# Patient Record
Sex: FEMALE | URBAN - METROPOLITAN AREA
[De-identification: names, ages, dates, MRNs, and addresses within clinical notes are randomized per-mention and may not be internally consistent; named-entity substitution may affect disease eponyms.]

---

## 2022-05-27 ENCOUNTER — OFFICE VISIT (OUTPATIENT)
Dept: OBGYN CLINIC | Facility: CLINIC | Age: 22
End: 2022-05-27
Payer: COMMERCIAL

## 2022-05-27 VITALS
HEIGHT: 65 IN | SYSTOLIC BLOOD PRESSURE: 110 MMHG | DIASTOLIC BLOOD PRESSURE: 74 MMHG | BODY MASS INDEX: 23.57 KG/M2 | HEART RATE: 81 BPM | WEIGHT: 141.5 LBS

## 2022-05-27 DIAGNOSIS — N89.8 VAGINAL DISCHARGE: ICD-10-CM

## 2022-05-27 DIAGNOSIS — Z11.3 ROUTINE SCREENING FOR STI (SEXUALLY TRANSMITTED INFECTION): ICD-10-CM

## 2022-05-27 DIAGNOSIS — Z01.419 ENCOUNTER FOR WELL WOMAN EXAM WITH ROUTINE GYNECOLOGICAL EXAM: Primary | ICD-10-CM

## 2022-05-27 RX ORDER — IBUPROFEN 600 MG/1
TABLET ORAL
COMMUNITY

## 2022-05-27 RX ORDER — BUSPIRONE HYDROCHLORIDE 5 MG/1
TABLET ORAL
COMMUNITY
Start: 2022-05-23 | End: 2022-05-27

## 2022-05-27 RX ORDER — NORTRIPTYLINE HYDROCHLORIDE 10 MG/1
10 CAPSULE ORAL NIGHTLY
COMMUNITY
Start: 2022-05-02 | End: 2023-05-02

## 2022-05-27 RX ORDER — RIZATRIPTAN BENZOATE 10 MG/1
10 TABLET ORAL
COMMUNITY
Start: 2022-05-02

## 2022-05-27 RX ORDER — BUSPIRONE HYDROCHLORIDE 5 MG/1
1 TABLET ORAL 3 TIMES DAILY
COMMUNITY
Start: 2022-05-23 | End: 2022-05-27

## 2022-05-31 DIAGNOSIS — B96.89 BV (BACTERIAL VAGINOSIS): Primary | ICD-10-CM

## 2022-05-31 DIAGNOSIS — N76.0 BV (BACTERIAL VAGINOSIS): Primary | ICD-10-CM

## 2022-05-31 LAB
C TRACH DNA SPEC QL NAA+PROBE: NEGATIVE
N GONORRHOEA DNA SPEC QL NAA+PROBE: NEGATIVE

## 2022-05-31 RX ORDER — METRONIDAZOLE 7.5 MG/G
1 GEL VAGINAL NIGHTLY
Qty: 70 G | Refills: 0 | Status: SHIPPED | OUTPATIENT
Start: 2022-05-31 | End: 2022-06-05

## 2025-03-10 ENCOUNTER — OFFICE VISIT (OUTPATIENT)
Dept: FAMILY MEDICINE CLINIC | Facility: CLINIC | Age: 25
End: 2025-03-10
Payer: COMMERCIAL

## 2025-03-10 ENCOUNTER — PATIENT MESSAGE (OUTPATIENT)
Dept: FAMILY MEDICINE CLINIC | Facility: CLINIC | Age: 25
End: 2025-03-10

## 2025-03-10 VITALS
OXYGEN SATURATION: 99 % | HEIGHT: 65 IN | WEIGHT: 199 LBS | HEART RATE: 78 BPM | BODY MASS INDEX: 33.15 KG/M2 | DIASTOLIC BLOOD PRESSURE: 70 MMHG | SYSTOLIC BLOOD PRESSURE: 118 MMHG

## 2025-03-10 DIAGNOSIS — Z23 NEED FOR DIPHTHERIA-TETANUS-PERTUSSIS (TDAP) VACCINE: ICD-10-CM

## 2025-03-10 DIAGNOSIS — F90.0 ATTENTION DEFICIT HYPERACTIVITY DISORDER (ADHD), PREDOMINANTLY INATTENTIVE TYPE: ICD-10-CM

## 2025-03-10 DIAGNOSIS — F41.9 ANXIETY: ICD-10-CM

## 2025-03-10 DIAGNOSIS — Z00.00 ROUTINE GENERAL MEDICAL EXAMINATION AT A HEALTH CARE FACILITY: Primary | ICD-10-CM

## 2025-03-10 RX ORDER — FLUOXETINE 10 MG/1
10 CAPSULE ORAL DAILY
Qty: 30 CAPSULE | Refills: 1 | Status: SHIPPED | OUTPATIENT
Start: 2025-03-10

## 2025-03-10 RX ORDER — HYDROXYZINE HYDROCHLORIDE 25 MG/1
25 TABLET, FILM COATED ORAL 3 TIMES DAILY PRN
Qty: 30 TABLET | Refills: 0 | Status: SHIPPED | OUTPATIENT
Start: 2025-03-10

## 2025-03-10 NOTE — PROGRESS NOTES
Subjective:   Elena Mills is a 25 year old female who presents for Establish Care (New patient physical /Would like her pap /Also has been having some anxiety)            The following individual(s) verbally consented to be recorded using ambient AI listening technology and understand that they can each withdraw their consent to this listening technology at any point by asking the clinician to turn off or pause the recording:    Patient name: Elena Mills       History/Other:   History of Present Illness  The patient presents for an annual physical exam.    She experiences significant anxiety, describing it as feeling like she goes into the 'backseat of her brain' with a 'big feeling of doom.' This began last year after her best friend's father passed away. 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 She is not on birth control, having found more cons than pros in the past. She manages her anxiety with self-talk and mindfulness, but it significantly impacts her life, causing her to cry and feel overwhelmed. She exercises regularly, which helps with her anxiety, although sometimes her anxiety prevents her from exercising. She sleeps well, averaging around eight hours a night, and takes naps. No hallucinations.    Regarding ADHD, she feels it affects her work, making her feel disorganized and unable to focus on one task at a time. Some days are worse than others, and she feels she is not reaching her full potential at work. She previously took Ritalin for ADHD but discontinued it due to cost when she was uninsured.    Her migraines have decreased in frequency over the years, now occurring about six or seven times a year. They were more severe when she was younger. She tried a medication for migraines as needed, but it was not effective, so she stopped using it.    She is a  at [employer] and lives with her  parents. She is sexually active and uses condoms for contraception. She quit vaping about a year ago and does not smoke or consume alcohol. She reports no breast pain, lesions, lumps, or unusual rashes. She does not experience numbness or tingling in her hands or feet.       Chief Complaint Reviewed and Verified  Nursing Notes Reviewed and   Verified  Tobacco Reviewed  Allergies Reviewed  Medications Reviewed    Problem List Reviewed  Medical History Reviewed  Surgical History   Reviewed  Family History Reviewed  Social History Reviewed         Tobacco:       Current Outpatient Medications   Medication Sig Dispense Refill    FLUoxetine 10 MG Oral Cap Take 1 capsule (10 mg total) by mouth daily. 30 capsule 1    hydrOXYzine 25 MG Oral Tab Take 1 tablet (25 mg total) by mouth 3 (three) times daily as needed for Anxiety. 30 tablet 0    ibuprofen 600 MG Oral Tab ibuprofen 600 mg tablet   TAKE 1 TABLET BY MOUTH TWICE DAILY AFTER MEALS FOR 10 DAYS (Patient not taking: Reported on 3/10/2025)      Rizatriptan Benzoate 10 MG Oral Tab Take 1 tablet (10 mg total) by mouth. (Patient not taking: Reported on 3/10/2025)           Review of Systems:  Review of Systems   Constitutional:  Negative for chills, fatigue and fever.   HENT:  Negative for hearing loss, rhinorrhea and sinus pressure.    Eyes:  Negative for visual disturbance.   Respiratory:  Negative for cough and shortness of breath.    Cardiovascular:  Negative for chest pain and palpitations.   Gastrointestinal:  Negative for abdominal pain, constipation, diarrhea, nausea and vomiting.   Genitourinary:  Negative for dysuria and frequency.   Musculoskeletal:  Negative for back pain and myalgias.   Skin:  Negative for rash.   Neurological:  Negative for dizziness, light-headedness and headaches.   Hematological:  Does not bruise/bleed easily.   Psychiatric/Behavioral:  Positive for decreased concentration. Negative for dysphoric mood, hallucinations, sleep  disturbance and suicidal ideas. The patient is nervous/anxious.          Objective:   /70   Pulse 78   Ht 5' 5\" (1.651 m)   Wt 199 lb (90.3 kg)   SpO2 99%   BMI 33.12 kg/m²  Estimated body mass index is 33.12 kg/m² as calculated from the following:    Height as of this encounter: 5' 5\" (1.651 m).    Weight as of this encounter: 199 lb (90.3 kg).  Physical Exam  Constitutional:       General: She is not in acute distress.     Appearance: She is well-developed.   HENT:      Mouth/Throat:      Pharynx: No posterior oropharyngeal erythema.   Eyes:      General: No scleral icterus.     Conjunctiva/sclera: Conjunctivae normal.      Pupils: Pupils are equal, round, and reactive to light.   Cardiovascular:      Rate and Rhythm: Normal rate and regular rhythm.      Heart sounds: No murmur heard.  Pulmonary:      Effort: Pulmonary effort is normal.      Breath sounds: Normal breath sounds.   Chest:   Breasts:     Breasts are symmetrical.      Right: No mass, nipple discharge, skin change or tenderness.      Left: No mass, nipple discharge, skin change or tenderness.   Abdominal:      General: Bowel sounds are normal.      Palpations: Abdomen is soft. There is no mass.      Tenderness: There is no abdominal tenderness. There is no guarding.   Musculoskeletal:      Cervical back: Neck supple.   Lymphadenopathy:      Cervical: No cervical adenopathy.      Upper Body:      Right upper body: No supraclavicular or axillary adenopathy.      Left upper body: No supraclavicular or axillary adenopathy.   Skin:     Capillary Refill: Capillary refill takes less than 2 seconds.      Findings: No bruising or rash.   Neurological:      General: No focal deficit present.      Mental Status: She is alert and oriented to person, place, and time.   Psychiatric:         Mood and Affect: Mood is anxious. Affect is tearful.         Behavior: Behavior normal.         Thought Content: Thought content normal.        Results          Assessment & Plan:   1. Routine general medical examination at a health care facility (Primary)  -     CBC With Differential With Platelet; Future; Expected date: 03/10/2025  -     Comp Metabolic Panel (14); Future; Expected date: 03/10/2025  -     Lipid Panel; Future; Expected date: 03/10/2025  -     TSH W Reflex To Free T4; Future; Expected date: 03/10/2025  -Order routine blood work.  -Administer tetanus booster today as it is due (once every 10 years).  Reports of regular but shortened menstrual cycles (every 25 days). No current use of birth control.  -Continue to monitor. No intervention needed at this time.    2. Attention deficit hyperactivity disorder (ADHD), predominantly inattentive type  -stable   Referral for therapy/counseling to continue coping skills.   Consider SNRI or stimulant if needed     3. Anxiety  -     Behavioral Health Consultation  -     FLUoxetine HCl; Take 1 capsule (10 mg total) by mouth daily.  Dispense: 30 capsule; Refill: 1  -     hydrOXYzine HCl; Take 1 tablet (25 mg total) by mouth 3 (three) times daily as needed for Anxiety.  Dispense: 30 tablet; Refill: 0  Reports of increased anxiety, feelings of impending doom, and crying. Symptoms seem to be exacerbated around the time of her menstrual cycle.   -Start Prozac at a low dose for daily management of anxiety.  -Provide a quick-acting medication for acute anxiety episodes.  -Referral for therapy/counseling to develop better coping skills.  -Return visit in a few months to assess the effectiveness of the treatment plan.    4. Need for diphtheria-tetanus-pertussis (Tdap) vaccine  -     TETANUS, DIPHTHERIA TOXOIDS AND ACELLULAR PERTUSIS VACCINE (TDAP), >7 YEARS, IM USE    Assessment & Plan              Return in about 2 months (around 5/10/2025) for anxiety.      AYDEN WALDEN MD, 3/10/2025, 9:31 AM

## 2025-05-13 ENCOUNTER — TELEPHONE (OUTPATIENT)
Dept: FAMILY MEDICINE CLINIC | Facility: CLINIC | Age: 25
End: 2025-05-13

## 2025-05-13 DIAGNOSIS — F41.9 ANXIETY: ICD-10-CM

## 2025-05-13 RX ORDER — FLUOXETINE 10 MG/1
10 CAPSULE ORAL DAILY
Qty: 30 CAPSULE | Refills: 1 | Status: SHIPPED | OUTPATIENT
Start: 2025-05-13

## 2025-05-13 NOTE — TELEPHONE ENCOUNTER
Refill passed per Clinic protocol.    Patient was to follow up in 2 months- sent [employer] message to patient to schedule an appointment.     Please advise on refill     Requested Prescriptions   Pending Prescriptions Disp Refills    FLUOXETINE 10 MG Oral Cap [Pharmacy Med Name: FLUOXETINE 10MG CAPSULES] 30 capsule 1     Sig: TAKE 1 CAPSULE(10 MG) BY MOUTH DAILY       Psychiatric Non-Scheduled (Anti-Anxiety) Passed - 5/13/2025  3:03 PM        Passed - In person appointment or virtual visit in the past 6 mos or appointment in next 3 mos     Recent Outpatient Visits              2 months ago Routine general medical examination at a health care facility    [facility], [address] Eve Morgan MD    Office Visit    2 years ago Encounter for well woman exam with routine gynecological exam    [facility], [address] [place] [address] - OB/GYN Meagan Patrick MD    Office Visit                      Passed - Depression Screening completed within the past 12 months        Passed - Medication is active on med list